# Patient Record
Sex: FEMALE | Race: WHITE | NOT HISPANIC OR LATINO | URBAN - NONMETROPOLITAN AREA
[De-identification: names, ages, dates, MRNs, and addresses within clinical notes are randomized per-mention and may not be internally consistent; named-entity substitution may affect disease eponyms.]

---

## 2020-02-20 ENCOUNTER — IMPORTED ENCOUNTER (OUTPATIENT)
Dept: URBAN - NONMETROPOLITAN AREA CLINIC 1 | Facility: CLINIC | Age: 65
End: 2020-02-20

## 2020-02-20 PROCEDURE — S0621 ROUTINE OPHTHALMOLOGICAL EXA: HCPCS

## 2020-02-20 NOTE — PATIENT DISCUSSION
SONIA OU-  discussed findings w/patient-  mild symptoms noted at this time-  continue AT's PRN OU-  continue to monitor yearly or prnCataracts OU-  discussed findings w/patient-  no treatment indicated at this time-  UV protection recommended -  continue to monitor yearly or prnSimple Myopia OU w/Presbyopia-  discussed findings w/patient-  new spectacle Rx issued-  continue to monitor yearly or prn; 's Notes: MR 2/20/2020DFE 2/20/2020

## 2020-02-21 PROBLEM — H16.223: Noted: 2020-02-21

## 2020-02-21 PROBLEM — H52.4: Noted: 2020-02-21

## 2020-02-21 PROBLEM — H25.13: Noted: 2020-02-21

## 2020-02-21 PROBLEM — H52.13: Noted: 2020-02-21

## 2020-07-02 ENCOUNTER — IMPORTED ENCOUNTER (OUTPATIENT)
Dept: URBAN - NONMETROPOLITAN AREA CLINIC 1 | Facility: CLINIC | Age: 65
End: 2020-07-02

## 2020-07-02 PROBLEM — H25.13: Noted: 2020-07-02

## 2020-07-02 PROBLEM — H16.223: Noted: 2020-07-02

## 2020-07-02 PROBLEM — H10.423: Noted: 2020-07-02

## 2020-07-02 PROBLEM — H52.13: Noted: 2020-07-02

## 2020-07-02 PROBLEM — H52.4: Noted: 2020-07-02

## 2020-07-02 PROCEDURE — 99212 OFFICE O/P EST SF 10 MIN: CPT

## 2020-07-02 NOTE — PATIENT DISCUSSION
Ocular Allergies OU-  discussed findings w/patient-  start Lotemax QID OU x 1 week then BID OU x 1 week then d/c-  ok to start Pataday -  monitor as scheduled or prn; 's Notes: MR 2/20/2020DFE 2/20/2020

## 2022-04-09 ASSESSMENT — VISUAL ACUITY
OS_SC: 20/20
OU_SC: 20/20
OS_SC: 20/20
OD_SC: 20/20
OD_SC: 20/20

## 2022-04-09 ASSESSMENT — TONOMETRY
OD_IOP_MMHG: 16
OD_IOP_MMHG: 16
OS_IOP_MMHG: 16
OS_IOP_MMHG: 16